# Patient Record
Sex: MALE | Race: OTHER
[De-identification: names, ages, dates, MRNs, and addresses within clinical notes are randomized per-mention and may not be internally consistent; named-entity substitution may affect disease eponyms.]

---

## 2018-11-15 ENCOUNTER — HOSPITAL ENCOUNTER (EMERGENCY)
Dept: HOSPITAL 72 - EMR | Age: 48
Discharge: HOME | End: 2018-11-15
Payer: COMMERCIAL

## 2018-11-15 VITALS — DIASTOLIC BLOOD PRESSURE: 90 MMHG | SYSTOLIC BLOOD PRESSURE: 138 MMHG

## 2018-11-15 VITALS — DIASTOLIC BLOOD PRESSURE: 103 MMHG | SYSTOLIC BLOOD PRESSURE: 175 MMHG

## 2018-11-15 VITALS — SYSTOLIC BLOOD PRESSURE: 175 MMHG | DIASTOLIC BLOOD PRESSURE: 102 MMHG

## 2018-11-15 VITALS — WEIGHT: 160 LBS | BODY MASS INDEX: 30.21 KG/M2 | HEIGHT: 61 IN

## 2018-11-15 VITALS — SYSTOLIC BLOOD PRESSURE: 168 MMHG | DIASTOLIC BLOOD PRESSURE: 104 MMHG

## 2018-11-15 VITALS — DIASTOLIC BLOOD PRESSURE: 106 MMHG | SYSTOLIC BLOOD PRESSURE: 167 MMHG

## 2018-11-15 VITALS — DIASTOLIC BLOOD PRESSURE: 102 MMHG | SYSTOLIC BLOOD PRESSURE: 175 MMHG

## 2018-11-15 DIAGNOSIS — R10.9: ICD-10-CM

## 2018-11-15 DIAGNOSIS — Z86.69: ICD-10-CM

## 2018-11-15 DIAGNOSIS — F15.10: ICD-10-CM

## 2018-11-15 DIAGNOSIS — Z88.6: ICD-10-CM

## 2018-11-15 DIAGNOSIS — I10: ICD-10-CM

## 2018-11-15 DIAGNOSIS — R41.82: ICD-10-CM
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
## 2018-11-15 DIAGNOSIS — F12.10: ICD-10-CM

## 2018-11-15 DIAGNOSIS — T50.995A: ICD-10-CM

## 2018-11-15 DIAGNOSIS — R11.2: Primary | ICD-10-CM

## 2018-11-15 LAB
ADD MANUAL DIFF: NO
ALBUMIN SERPL-MCNC: 4.1 G/DL (ref 3.4–5)
ALBUMIN/GLOB SERPL: 0.9 {RATIO} (ref 1–2.7)
ALP SERPL-CCNC: 82 U/L (ref 46–116)
ALT SERPL-CCNC: 78 U/L (ref 12–78)
ANION GAP SERPL CALC-SCNC: 9 MMOL/L (ref 5–15)
APPEARANCE UR: (no result)
APTT BLD: 27 SEC (ref 23–33)
APTT PPP: (no result) S
AST SERPL-CCNC: 57 U/L (ref 15–37)
BASOPHILS NFR BLD AUTO: 0.8 % (ref 0–2)
BILIRUB SERPL-MCNC: 0.5 MG/DL (ref 0.2–1)
BUN SERPL-MCNC: 19 MG/DL (ref 7–18)
CALCIUM SERPL-MCNC: 10.2 MG/DL (ref 8.5–10.1)
CHLORIDE SERPL-SCNC: 103 MMOL/L (ref 98–107)
CK SERPL-CCNC: 341 U/L (ref 26–308)
CO2 SERPL-SCNC: 30 MMOL/L (ref 21–32)
CREAT SERPL-MCNC: 1.2 MG/DL (ref 0.55–1.3)
EOSINOPHIL NFR BLD AUTO: 0.5 % (ref 0–3)
ERYTHROCYTE [DISTWIDTH] IN BLOOD BY AUTOMATED COUNT: 11 % (ref 11.6–14.8)
GLOBULIN SER-MCNC: 4.7 G/DL
GLUCOSE UR STRIP-MCNC: NEGATIVE MG/DL
HCT VFR BLD CALC: 53.1 % (ref 42–52)
HGB BLD-MCNC: 18.6 G/DL (ref 14.2–18)
INR PPP: 0.9 (ref 0.9–1.1)
KETONES UR QL STRIP: (no result)
LEUKOCYTE ESTERASE UR QL STRIP: NEGATIVE
LYMPHOCYTES NFR BLD AUTO: 35.2 % (ref 20–45)
MCV RBC AUTO: 93 FL (ref 80–99)
MONOCYTES NFR BLD AUTO: 11.2 % (ref 1–10)
NEUTROPHILS NFR BLD AUTO: 52.3 % (ref 45–75)
NITRITE UR QL STRIP: NEGATIVE
PH UR STRIP: 8 [PH] (ref 4.5–8)
PLATELET # BLD: 186 K/UL (ref 150–450)
POTASSIUM SERPL-SCNC: 3.8 MMOL/L (ref 3.5–5.1)
PROT UR QL STRIP: (no result)
RBC # BLD AUTO: 5.74 M/UL (ref 4.7–6.1)
SODIUM SERPL-SCNC: 142 MMOL/L (ref 136–145)
SP GR UR STRIP: 1.01 (ref 1–1.03)
UROBILINOGEN UR-MCNC: NORMAL MG/DL (ref 0–1)
WBC # BLD AUTO: 7.4 K/UL (ref 4.8–10.8)

## 2018-11-15 PROCEDURE — 80307 DRUG TEST PRSMV CHEM ANLYZR: CPT

## 2018-11-15 PROCEDURE — 85730 THROMBOPLASTIN TIME PARTIAL: CPT

## 2018-11-15 PROCEDURE — 83690 ASSAY OF LIPASE: CPT

## 2018-11-15 PROCEDURE — 80053 COMPREHEN METABOLIC PANEL: CPT

## 2018-11-15 PROCEDURE — 96361 HYDRATE IV INFUSION ADD-ON: CPT

## 2018-11-15 PROCEDURE — 36415 COLL VENOUS BLD VENIPUNCTURE: CPT

## 2018-11-15 PROCEDURE — 93005 ELECTROCARDIOGRAM TRACING: CPT

## 2018-11-15 PROCEDURE — 85025 COMPLETE CBC W/AUTO DIFF WBC: CPT

## 2018-11-15 PROCEDURE — 85610 PROTHROMBIN TIME: CPT

## 2018-11-15 PROCEDURE — 99284 EMERGENCY DEPT VISIT MOD MDM: CPT

## 2018-11-15 PROCEDURE — 70450 CT HEAD/BRAIN W/O DYE: CPT

## 2018-11-15 PROCEDURE — 82550 ASSAY OF CK (CPK): CPT

## 2018-11-15 PROCEDURE — 84484 ASSAY OF TROPONIN QUANT: CPT

## 2018-11-15 PROCEDURE — 96374 THER/PROPH/DIAG INJ IV PUSH: CPT

## 2018-11-15 PROCEDURE — 81003 URINALYSIS AUTO W/O SCOPE: CPT

## 2018-11-15 PROCEDURE — 80329 ANALGESICS NON-OPIOID 1 OR 2: CPT

## 2018-11-15 NOTE — DIAGNOSTIC IMAGING REPORT
Indication: Altered mental status

 

Technique: Continuous helical CT scanning of the head was performed without

intravenous contrast material. Axial and coronal 5 mm sections were generated.

Radiation dose was minimized using automated exposure control

 

Dose:

Total Dose Length Product - DLP 1347.93 mGycm.

Volume CT Dose Index - CTDIvol(s) 70.38 mGy.

 

Comparison: none

 

Findings: The ventricular system is normal in size and configuration. There is no

shift of midline structures. No abnormal extra-axial fluid collections are noted.

There is no evidence of intracerebral bleeding. No other abnormal high or low density

areas are noted within the brain.  Intact calvarium. Visualized orbits and sinuses

are unremarkable. Gray-white differentiation is normal.

 

Impression: Normal CT scan of the head without contrast material.

 

 

 

 

The CT scanner at [facility] is accredited by the [employer] College [employer]

Radiology and the scans are performed using protocols designed to limit radiation

exposure to as low as reasonably achievable to attain images of sufficient resolution

adequate for diagnostic evaluation.

## 2018-11-15 NOTE — EMERGENCY ROOM REPORT
History of Present Illness


General


Chief Complaint:  Abdominal Pain


Source:  Patient





Present Illness


HPI


This patient states that he took for Extenze tablets and did methamphetamine, 

marijuana and took 2 Tylenol DM last night.  He states that about a half hour 

after taking the Extenze tablets he became very nauseated and has had multiple 

episodes of vomiting and crampy abdominal pain.  He states he also felt very 

sweaty and anxious.  He denies recent illness.  He denies diarrhea.  He has no 

other complaints.


Allergies:  


Coded Allergies:  


     IBUPROFEN (Unverified  Allergy, Unknown, 11/15/18)





Patient History


Past Medical History:  see triage record, HTN, seizures


Social History:  Reports: smoking, alcohol use, drug use


Reviewed Nursing Documentation:  PMH: Agreed; PSxH: Agreed





Nursing Documentation-PMH


Past Medical History:  No History, Except For


Hx Hypertension:  Yes


History Of Psychiatric Problem:  Yes


Hx Seizures:  Yes





Review of Systems


All Other Systems:  negative except mentioned in HPI





Physical Exam





Vital Signs








  Date Time  Temp Pulse Resp B/P (MAP) Pulse Ox O2 Delivery O2 Flow Rate FiO2


 


11/15/18 08:31 98.1 111 20 138/90 98   


 


11/15/18 09:37      Room Air  








Sp02 EP Interpretation:  reviewed, normal


General Appearance:  no apparent distress, alert, GCS 15, non-toxic, other - 

vomiting


Head:  normocephalic, atraumatic


Eyes:  bilateral eye normal inspection, bilateral eye PERRL


ENT:  hearing grossly normal, normal pharynx, no angioedema, normal voice


Neck:  full range of motion, supple/symm/no masses


Respiratory:  chest non-tender, lungs clear, normal breath sounds, no 

respiratory distress, no retraction, no accessory muscle use, speaking full 

sentences


Cardiovascular #1:  regular rate, rhythm, no edema


Gastrointestinal:  normal bowel sounds, non tender, soft, non-distended, no 

guarding, no rebound


Rectal:  deferred


Musculoskeletal:  back normal, gait/station normal, normal range of motion, non-

tender


Neurologic:  alert, oriented x3, responsive, motor strength/tone normal, 

sensory intact, speech normal


Psychiatric:  judgement/insight normal, memory normal, no suicidal/homicidal 

ideation, anxious


Skin:  normal color, no rash, warm/dry, well hydrated





Medical Decision Making


Diagnostic Impression:  


 Primary Impression:  


 Medication side effects


 Additional Impression:  


 Polysubstance abuse


ER Course


This patient presents with overtaking Extenze.  He has nausea vomiting and 

abdominal pain consistent with overtaking a particular supplement.  The case 

was discussed with poison control.  Treatment is supportive.  He was allowed to 

rest and sleep in the emergency department was given nausea control.  He was 

also found to have amphetamine positive which explains his bizarre behavior and 

affect.  He cleared his affect here in the emergency department.  He had no 

further nausea or vomiting.  He had no further abdominal pain.  He was educated 

on the dangers of polysubstance abuse and overtaking supplements.  He indicated 

understanding.  He was comfortable and clinically sober at the time of 

discharge.  The patient is given close return precautions and follow-up 

instructions.





Laboratory Tests








Test


  11/15/18


08:50 11/15/18


09:59


 


White Blood Count


  7.4 K/UL


(4.8-10.8) 


 


 


Red Blood Count


  5.74 M/UL


(4.70-6.10) 


 


 


Hemoglobin


  18.6 G/DL


(14.2-18.0)  *H 


 


 


Hematocrit


  53.1 %


(42.0-52.0)  H 


 


 


Mean Corpuscular Volume 93 FL (80-99)   


 


Mean Corpuscular Hemoglobin


  32.3 PG


(27.0-31.0)  H 


 


 


Mean Corpuscular Hemoglobin


Concent 34.9 G/DL


(32.0-36.0) 


 


 


Red Cell Distribution Width


  11.0 %


(11.6-14.8)  L 


 


 


Platelet Count


  186 K/UL


(150-450) 


 


 


Mean Platelet Volume


  10.6 FL


(6.5-10.1)  H 


 


 


Neutrophils (%) (Auto)


  52.3 %


(45.0-75.0) 


 


 


Lymphocytes (%) (Auto)


  35.2 %


(20.0-45.0) 


 


 


Monocytes (%) (Auto)


  11.2 %


(1.0-10.0)  H 


 


 


Eosinophils (%) (Auto)


  0.5 %


(0.0-3.0) 


 


 


Basophils (%) (Auto)


  0.8 %


(0.0-2.0) 


 


 


Prothrombin Time


  9.7 SEC


(9.30-11.50) 


 


 


Prothrombin Time INR 0.9 (0.9-1.1)   


 


PTT


  27 SEC (23-33)


  


 


 


Sodium Level


  142 MMOL/L


(136-145) 


 


 


Potassium Level


  3.8 MMOL/L


(3.5-5.1) 


 


 


Chloride Level


  103 MMOL/L


() 


 


 


Carbon Dioxide Level


  30 MMOL/L


(21-32) 


 


 


Anion Gap


  9 mmol/L


(5-15) 


 


 


Blood Urea Nitrogen


  19 mg/dL


(7-18)  H 


 


 


Creatinine


  1.2 MG/DL


(0.55-1.30) 


 


 


Estimate Glomerular


Filtration Rate > 60 mL/min


(>60) 


 


 


Glucose Level


  102 MG/DL


() 


 


 


Calcium Level


  10.2 MG/DL


(8.5-10.1)  H 


 


 


Total Bilirubin


  0.5 MG/DL


(0.2-1.0) 


 


 


Aspartate Amino Transferase


(AST) 57 U/L (15-37)


H 


 


 


Alanine Aminotransferase (ALT)


  78 U/L (12-78)


  


 


 


Alkaline Phosphatase


  82 U/L


() 


 


 


Total Creatine Kinase


  341 U/L


()  H 


 


 


Troponin I


  0.000 ng/mL


(0.000-0.056) 


 


 


Total Protein


  8.8 G/DL


(6.4-8.2)  H 


 


 


Albumin


  4.1 G/DL


(3.4-5.0) 


 


 


Globulin 4.7 g/dL   


 


Albumin/Globulin Ratio


  0.9 (1.0-2.7)


L 


 


 


Lipase


  150 U/L


() 


 


 


Salicylates Level


  1.4 ug/mL


(2.8-20)  L 


 


 


Acetaminophen Level


  < 2 MCG/ML


(10-30)  L 


 


 


Serum Alcohol < 3 mg/dL   


 


Urine Color  Pale yellow  


 


Urine Appearance  Cloudy  


 


Urine pH  8 (4.5-8.0)  


 


Urine Specific Gravity


  


  1.015


(1.005-1.035)


 


Urine Protein


  


  2+ (NEGATIVE)


H


 


Urine Glucose (UA)


  


  Negative


(NEGATIVE)


 


Urine Ketones


  


  1+ (NEGATIVE)


H


 


Urine Blood


  


  Negative


(NEGATIVE)


 


Urine Nitrite


  


  Negative


(NEGATIVE)


 


Urine Bilirubin


  


  Negative


(NEGATIVE)


 


Urine Urobilinogen


  


  Normal MG/DL


(0.0-1.0)


 


Urine Leukocyte Esterase


  


  Negative


(NEGATIVE)


 


Urine RBC


  


  0-2 /HPF (0 -


0)  H


 


Urine WBC


  


  0-2 /HPF (0 -


0)


 


Urine Squamous Epithelial


Cells 


  Occasional


/LPF


 


Urine Amorphous Sediment


  


  Many /LPF


(NONE)  H


 


Urine Bacteria


  


  Occasional


/HPF (NONE)


 


Urine Opiates Screen


  


  Negative


(NEGATIVE)


 


Urine Barbiturates Screen


  


  Negative


(NEGATIVE)


 


Phencyclidine (PCP) Screen


  


  Negative


(NEGATIVE)


 


Urine Amphetamines Screen


  


  Positive


(NEGATIVE)  H


 


Urine Benzodiazepines Screen


  


  Negative


(NEGATIVE)


 


Urine Cocaine Screen


  


  Negative


(NEGATIVE)


 


Urine Marijuana (THC) Screen


  


  Positive


(NEGATIVE)  H








EKG Diagnostic Results


Rate:  normal


Rhythm:  NSR


ST Segments:  no acute changes





Rhythm Strip Diag. Results


EP Interpretation:  yes


Rate:  90's


Rhythm:  NSR, no PVC's, no ectopy





CT/MRI/US Diagnostic Results


CT/MRI/US Diagnostic Results :  


   Imaging Test Ordered:  CT head


   Impression


No acute findings.  Specifically no intracranial bleed, mass effect or edema.  

See official report.





Last Vital Signs








  Date Time  Temp Pulse Resp B/P (MAP) Pulse Ox O2 Delivery O2 Flow Rate FiO2


 


11/15/18 14:06 98.1 108 22 167/106 99 Room Air  








Status:  improved


Disposition:  HOME, SELF-CARE


Condition:  Improved


Scripts


Unable to Obtain Active Prescriptions or Reported Meds


Referrals:  


H CLAUDE HUDSON,REFERRING (PCP)











Anu Hernandez DO Nov 15, 2018 14:46